# Patient Record
Sex: FEMALE | URBAN - METROPOLITAN AREA
[De-identification: names, ages, dates, MRNs, and addresses within clinical notes are randomized per-mention and may not be internally consistent; named-entity substitution may affect disease eponyms.]

---

## 2018-04-13 ENCOUNTER — APPOINTMENT (OUTPATIENT)
Dept: URBAN - METROPOLITAN AREA CLINIC 237 | Age: 63
Setting detail: DERMATOLOGY
End: 2018-04-21

## 2018-04-13 DIAGNOSIS — L72.11 PILAR CYST: ICD-10-CM

## 2018-04-13 DIAGNOSIS — Z41.9 ENCOUNTER FOR PROCEDURE FOR PURPOSES OTHER THAN REMEDYING HEALTH STATE, UNSPECIFIED: ICD-10-CM

## 2018-04-13 DIAGNOSIS — L65.9 NONSCARRING HAIR LOSS, UNSPECIFIED: ICD-10-CM

## 2018-04-13 PROCEDURE — OTHER COUNSELING: OTHER

## 2018-04-13 PROCEDURE — OTHER PATIENT SPECIFIC COUNSELING: OTHER

## 2018-04-13 PROCEDURE — OTHER TREATMENT REGIMEN: OTHER

## 2018-04-13 PROCEDURE — OTHER OTHER: OTHER

## 2018-04-13 PROCEDURE — OTHER COSMETIC CONSULTATION: FILLERS: OTHER

## 2018-04-13 PROCEDURE — 99202 OFFICE O/P NEW SF 15 MIN: CPT

## 2018-04-13 PROCEDURE — OTHER DIAGNOSIS COMMENT: OTHER

## 2018-04-13 PROCEDURE — OTHER ORDER TESTS: OTHER

## 2018-04-13 ASSESSMENT — LOCATION SIMPLE DESCRIPTION DERM
LOCATION SIMPLE: SCALP
LOCATION SIMPLE: LEFT SCALP

## 2018-04-13 ASSESSMENT — LOCATION DETAILED DESCRIPTION DERM
LOCATION DETAILED: LEFT MEDIAL FRONTAL SCALP
LOCATION DETAILED: RIGHT SUPERIOR PARIETAL SCALP

## 2018-04-13 ASSESSMENT — LOCATION ZONE DERM: LOCATION ZONE: SCALP

## 2018-04-13 NOTE — PROCEDURE: PATIENT SPECIFIC COUNSELING
MD discussed that Pt rarely eats red meat and we will check her ferritin and TSH with reflex.\\n \\nMD advised doing a weekly hair count on a day that she shampoos.\\n\\nDiscussed that diagnosis is non-specific, and we first need to check for any blood abnormalities\\n\\nMD advised taking Biotin 5000 mcg QD and Zinc 17 mg or more qd (as pt cannot tolerate MVI). Pt states that she is currently taking Vitamin D3 2000 IU QD
Detail Level: Zone
Discussed excising cyst.
Detail Level: Simple
Pt really needs filler to improve the volume of the perioral area and discussed this; see below, however, MD recommended starting Botox to mentalis to smooth chin as this would be at minimal cost and minimal SEs (would start with 8-10 units). Discussed that fillers are longer lasting, but bruising will occur. She would need an Auriderm kit to treat this.

## 2018-04-13 NOTE — PROCEDURE: OTHER
Detail Level: Simple
Other (Free Text): Hair pull test negative (but hair washing day)
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

## 2018-04-13 NOTE — HPI: HAIR LOSS
How Did The Hair Loss Occur?: gradual in onset
How Severe Is Your Hair Loss?: severe
What Hair Products Do You Use?: Nioxcin shampoo

## 2018-04-13 NOTE — HPI: OTHER
Condition:: Wrinkles
Please Describe Your Condition:: Pt would like to discuss treatment for wrinkles on chin.

## 2018-04-13 NOTE — PROCEDURE: DIAGNOSIS COMMENT
Comment: Poss TE and/or androgenetic alopecia
Detail Level: Simple
Comment: lower face rhytids and rippling

## 2018-04-26 ENCOUNTER — APPOINTMENT (OUTPATIENT)
Dept: URBAN - METROPOLITAN AREA CLINIC 237 | Age: 63
Setting detail: DERMATOLOGY
End: 2018-05-14

## 2018-04-26 DIAGNOSIS — L65.9 NONSCARRING HAIR LOSS, UNSPECIFIED: ICD-10-CM

## 2018-04-26 PROCEDURE — OTHER OTHER: OTHER

## 2018-04-26 ASSESSMENT — LOCATION DETAILED DESCRIPTION DERM: LOCATION DETAILED: POSTERIOR MID-PARIETAL SCALP

## 2018-04-26 ASSESSMENT — LOCATION ZONE DERM: LOCATION ZONE: SCALP

## 2018-04-26 ASSESSMENT — LOCATION SIMPLE DESCRIPTION DERM: LOCATION SIMPLE: POSTERIOR SCALP

## 2018-04-26 NOTE — PROCEDURE: OTHER
Other (Free Text): refer to note
Detail Level: Zone
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

## 2018-06-22 ENCOUNTER — APPOINTMENT (OUTPATIENT)
Dept: URBAN - METROPOLITAN AREA CLINIC 237 | Age: 63
Setting detail: DERMATOLOGY
End: 2018-06-22

## 2018-06-22 DIAGNOSIS — L65.9 NONSCARRING HAIR LOSS, UNSPECIFIED: ICD-10-CM

## 2018-06-22 DIAGNOSIS — L72.11 PILAR CYST: ICD-10-CM

## 2018-06-22 PROCEDURE — OTHER COUNSELING: OTHER

## 2018-06-22 PROCEDURE — OTHER DIAGNOSIS COMMENT: OTHER

## 2018-06-22 PROCEDURE — OTHER ORDER TESTS: OTHER

## 2018-06-22 PROCEDURE — 99213 OFFICE O/P EST LOW 20 MIN: CPT

## 2018-06-22 PROCEDURE — OTHER PATIENT SPECIFIC COUNSELING: OTHER

## 2018-06-22 PROCEDURE — OTHER TREATMENT REGIMEN: OTHER

## 2018-06-22 ASSESSMENT — LOCATION SIMPLE DESCRIPTION DERM
LOCATION SIMPLE: SCALP
LOCATION SIMPLE: LEFT SCALP

## 2018-06-22 ASSESSMENT — LOCATION ZONE DERM: LOCATION ZONE: SCALP

## 2018-06-22 ASSESSMENT — LOCATION DETAILED DESCRIPTION DERM
LOCATION DETAILED: LEFT MEDIAL FRONTAL SCALP
LOCATION DETAILED: RIGHT SUPERIOR PARIETAL SCALP

## 2018-06-22 NOTE — PROCEDURE: TREATMENT REGIMEN
Modify Regimen: Increase Iron supplement to QD
Continue Regimen: Biotin 5000 mcg QD
Detail Level: Simple

## 2018-06-22 NOTE — PROCEDURE: PATIENT SPECIFIC COUNSELING
Detail Level: Simple
MD assured pt that hair density is quite good, and ensured that she is using conditioner for hair breakage. Educated pt in detail about the  genetic and environmental components of hair loss related to low iron.\\n\\nPt would be quite happy if hair density stays as it is, as her hair was too thick. Advised pt MD uncertain what will happen in future, and that her loss during the winter months is counterintuitive to animal shedding.\\n\\nAgree with pt not to use anything that might damage her kidneys.\\n\\nPt's Cr is 1.03, and BUN 23.\\n
MD re-discussed excising cyst; since benign, pt would prefer to not remove it at this time.
Detail Level: Zone